# Patient Record
Sex: FEMALE | Race: WHITE | NOT HISPANIC OR LATINO | URBAN - METROPOLITAN AREA
[De-identification: names, ages, dates, MRNs, and addresses within clinical notes are randomized per-mention and may not be internally consistent; named-entity substitution may affect disease eponyms.]

---

## 2022-04-19 ENCOUNTER — WEB ENCOUNTER (OUTPATIENT)
Dept: URBAN - METROPOLITAN AREA CLINIC 70 | Facility: CLINIC | Age: 72
End: 2022-04-19

## 2022-04-19 ENCOUNTER — OFFICE VISIT (OUTPATIENT)
Dept: URBAN - METROPOLITAN AREA CLINIC 70 | Facility: CLINIC | Age: 72
End: 2022-04-19
Payer: MEDICARE

## 2022-04-19 VITALS
SYSTOLIC BLOOD PRESSURE: 153 MMHG | HEIGHT: 63 IN | WEIGHT: 199.3 LBS | TEMPERATURE: 97.3 F | BODY MASS INDEX: 35.31 KG/M2 | HEART RATE: 80 BPM | DIASTOLIC BLOOD PRESSURE: 98 MMHG

## 2022-04-19 DIAGNOSIS — K76.0 FATTY LIVER: ICD-10-CM

## 2022-04-19 DIAGNOSIS — R74.8 ELEVATED LIVER ENZYMES: ICD-10-CM

## 2022-04-19 PROCEDURE — 99203 OFFICE O/P NEW LOW 30 MIN: CPT

## 2022-04-19 RX ORDER — FENOFIBRATE 145 MG/1
TAKE 1 TABLET BY MOUTH DAILY TABLET ORAL
Qty: 90 | Refills: 1 | Status: ACTIVE | COMMUNITY

## 2022-04-19 RX ORDER — LOSARTAN POTASSIUM 100 MG/1
TAKE 1 TABLET BY MOUTH DAILY DIAGNOSIS UNAVAILABLE TABLET, FILM COATED ORAL
Qty: 90 | Refills: 0 | Status: ACTIVE | COMMUNITY

## 2022-04-19 RX ORDER — EZETIMIBE 10 MG/1
TABLET ORAL
Qty: 90 | Status: ACTIVE | COMMUNITY

## 2022-04-19 RX ORDER — GLYBURIDE 2.5 MG/1
TAKE 1 TABLET BY MOUTH ONCE A DAY TABLET ORAL
Qty: 90 | Refills: 1 | Status: ACTIVE | COMMUNITY

## 2022-04-19 RX ORDER — HYDROCHLOROTHIAZIDE 25 MG/1
TABLET ORAL
Qty: 90 | Status: DISCONTINUED | COMMUNITY

## 2022-04-19 NOTE — HPI-TODAY'S VISIT:
Pt presents for fatty liver and elevated liver enzymes.  US 3/10/2022 showed fatty liver, mild hepatomegaly,  and a small lesion of the right lobe possibly indicating a complicated cyst, small solid lesion, or focal fat sparing.  CT 4/5/2022 showed fatty liver, mild hepatomegaly and no masses.  Labs 3/14/2022 showed AST 55, ALT 55 (HIGH), alk phos 58, and bili 0.4 (WNL). She denies a prior known hx of elevated liver enzymes, known autoimmune disease, ETOH or IVDU, signs of jaundice, or new medications or herbal supplements.

## 2022-05-02 LAB
ACTIN (SMOOTH MUSCLE) ANTIBODY: 5
AFP, SERUM, TUMOR MARKER: 3.2
ALBUMIN: 4.5
ALKALINE PHOSPHATASE: 54
ALPHA-1-ANTITRYPSIN, SERUM: 123
ALT (SGPT): 30
ANTINUCLEAR ANTIBODIES, IFA: NEGATIVE
AST (SGOT): 29
BILIRUBIN, DIRECT: 0.14
BILIRUBIN, TOTAL: 0.4
CERULOPLASMIN: 31.4
HBSAG SCREEN: NEGATIVE
HCV AB: <0.1
HEP A AB, IGM: NEGATIVE
HEP B CORE AB, IGM: NEGATIVE
INTERPRETATION:: (no result)
Lab: (no result)
MITOCHONDRIAL (M2) ANTIBODY: <20
PROTEIN, TOTAL: 7.3

## 2022-06-02 ENCOUNTER — DASHBOARD ENCOUNTERS (OUTPATIENT)
Age: 72
End: 2022-06-02

## 2022-06-02 ENCOUNTER — OFFICE VISIT (OUTPATIENT)
Dept: URBAN - METROPOLITAN AREA CLINIC 70 | Facility: CLINIC | Age: 72
End: 2022-06-02
Payer: MEDICARE

## 2022-06-02 VITALS
SYSTOLIC BLOOD PRESSURE: 154 MMHG | WEIGHT: 198.2 LBS | TEMPERATURE: 97.5 F | DIASTOLIC BLOOD PRESSURE: 90 MMHG | HEIGHT: 63 IN | BODY MASS INDEX: 35.12 KG/M2 | HEART RATE: 96 BPM

## 2022-06-02 DIAGNOSIS — K76.0 FATTY LIVER: ICD-10-CM

## 2022-06-02 PROBLEM — 197321007: Status: ACTIVE | Noted: 2022-04-19

## 2022-06-02 PROCEDURE — 99214 OFFICE O/P EST MOD 30 MIN: CPT

## 2022-06-02 RX ORDER — FENOFIBRATE 145 MG/1
TAKE 1 TABLET BY MOUTH DAILY TABLET ORAL
Qty: 90 | Refills: 1 | Status: ACTIVE | COMMUNITY

## 2022-06-02 RX ORDER — GLYBURIDE 2.5 MG/1
TAKE 1 TABLET BY MOUTH ONCE A DAY TABLET ORAL
Qty: 90 | Refills: 1 | Status: ACTIVE | COMMUNITY

## 2022-06-02 RX ORDER — EZETIMIBE 10 MG/1
TABLET ORAL
Qty: 90 | Status: ACTIVE | COMMUNITY

## 2022-06-02 RX ORDER — LOSARTAN POTASSIUM 100 MG/1
TAKE 1 TABLET BY MOUTH DAILY DIAGNOSIS UNAVAILABLE TABLET, FILM COATED ORAL
Qty: 90 | Refills: 0 | Status: ACTIVE | COMMUNITY

## 2022-06-02 NOTE — HPI-OTHER HISTORIES
OV 4/19/2022: Pt presents for fatty liver and elevated liver enzymes.  US 3/10/2022 showed fatty liver, mild hepatomegaly,  and a small lesion of the right lobe possibly indicating a complicated cyst, small solid lesion, or focal fat sparing.  CT 4/5/2022 showed fatty liver, mild hepatomegaly and no masses.  Labs 3/14/2022 showed AST 55, ALT 55 (HIGH), alk phos 58, and bili 0.4 (WNL). She denies a prior known hx of elevated liver enzymes, known autoimmune disease, ETOH or IVDU, signs of jaundice, or new medications or herbal supplements.

## 2022-06-02 NOTE — HPI-TODAY'S VISIT:
Pt presents for a f/u for fatty liver and elevated LFTs. Labs 4/19/2022 showed LFTs WNL and were negative for autoimmune and viral hepatitis. She states that since her LOV, she has lost 15 lbs with diet and lifestyle changes.  She denies abdominal pain, diarrhea, constipation, nausea, or vomiting.